# Patient Record
Sex: FEMALE | Race: WHITE | NOT HISPANIC OR LATINO | Employment: OTHER | URBAN - METROPOLITAN AREA
[De-identification: names, ages, dates, MRNs, and addresses within clinical notes are randomized per-mention and may not be internally consistent; named-entity substitution may affect disease eponyms.]

---

## 2017-06-20 DIAGNOSIS — Z76.82 AWAITING ORGAN TRANSPLANT STATUS: Primary | ICD-10-CM

## 2017-07-11 NOTE — Clinical Note
July 11, 2017        BEATRICE Aguiar  [address]  [place] [address]         Dear BEATRICE Aguiar:    As part of our commitment to share important information with our transplant patients, we want to provide you with the most current kidney and kidney/pancreas transplant outcomes at the [facility] as published bi-annually by the Scientific Registry for Organ Recipients (SRTR).    For kidney transplants performed between 1/1/2014 and 6/30/2016 the 1-year patient and graft survival rates are as follows:   Kidney-Cadaveric Donor Kidney-Living Donor Kidney/Pancreas   Adults [facility] 1-Year Expected 1-Year [facility] 1-Year [facility] 1-Year Expected 1-Year National 1-Year [facility] 1-Year Expected 1-Year National 1-Year   Graft Survival 93.09% 94.31% 93.92% 97.83% 97.54% 97.75% 98.18% 96.73% 96.15%   Patient Survival 96.56% 96.91% 96.57% 100% 98.88% 98.83% 98.18% 97.52% 97.54%         To learn more about transplant outcomes in the [place] you can go to www.srtr.org.     Please call the Kidney Transplant Office at [phone] or [phone] should you have any questions or if:     Your insurance changes in any way   Your address or phone number changes   There are changes in your health   You are in the hospital or Emergency Room for any reason      Sincerely,  Kidney Transplant Team

## 2017-07-31 DIAGNOSIS — Z76.82 ORGAN TRANSPLANT CANDIDATE: ICD-10-CM

## 2018-01-08 ENCOUNTER — TELEPHONE (OUTPATIENT)
Dept: TRANSPLANT | Facility: CLINIC | Age: 78
End: 2018-01-08

## 2018-01-08 NOTE — TELEPHONE ENCOUNTER
Pt claims to not have a solid caregiver plan and not ready for transplant at this time, primarily due to fall at Transplant X-mas party. Pt is still pre-dialysis.

## 2018-04-19 PROBLEM — D63.1 ANEMIA IN STAGE 4 CHRONIC KIDNEY DISEASE: Status: ACTIVE | Noted: 2018-04-19

## 2018-04-19 PROBLEM — E87.6 HYPOKALEMIA: Status: ACTIVE | Noted: 2018-04-19

## 2018-04-19 PROBLEM — N18.4 CHRONIC KIDNEY DISEASE (CKD), STAGE IV (SEVERE): Status: ACTIVE | Noted: 2018-04-19

## 2018-04-19 PROBLEM — M10.00 IDIOPATHIC GOUT: Status: ACTIVE | Noted: 2018-04-19

## 2018-04-19 PROBLEM — N18.4 ANEMIA IN STAGE 4 CHRONIC KIDNEY DISEASE: Status: ACTIVE | Noted: 2018-04-19

## 2018-05-02 ENCOUNTER — CLINICAL SUPPORT (OUTPATIENT)
Dept: AUDIOLOGY | Facility: CLINIC | Age: 78
End: 2018-05-02

## 2018-05-02 DIAGNOSIS — H90.3 SENSORINEURAL HEARING LOSS, BILATERAL: Primary | ICD-10-CM

## 2018-05-02 NOTE — PROGRESS NOTES
Ms. Aguiar was seen for a hearing aid follow up appointment.  I increased the gain to 100%.  She did not think her hearing changed and decided not to complete an audiogram.    She purchased supplies and will call if she has any problems.

## 2018-06-14 PROBLEM — R80.9 PROTEINURIA OF UNDIAGNOSED CAUSE: Status: ACTIVE | Noted: 2018-06-14

## 2018-06-14 PROBLEM — I10 ESSENTIAL HYPERTENSION: Status: ACTIVE | Noted: 2018-06-14

## 2018-09-04 PROBLEM — T82.858A ARTERIOVENOUS FISTULA STENOSIS: Status: ACTIVE | Noted: 2018-09-04

## 2018-09-05 ENCOUNTER — TELEPHONE (OUTPATIENT)
Dept: CARDIOLOGY | Facility: HOSPITAL | Age: 78
End: 2018-09-05

## 2018-09-05 ENCOUNTER — TELEPHONE (OUTPATIENT)
Dept: CARDIOLOGY | Facility: CLINIC | Age: 78
End: 2018-09-05

## 2018-09-05 DIAGNOSIS — T82.898A PROBLEM WITH DIALYSIS ACCESS, INITIAL ENCOUNTER: Primary | ICD-10-CM

## 2018-09-05 NOTE — TELEPHONE ENCOUNTER
----- Message from Tabby Abebe sent at 9/5/2018  8:50 AM CDT -----  Contact: Self [phone]  New patient would like to speak with you about scheduling an appointment to check her port in her arm. Please advise

## 2018-09-05 NOTE — TELEPHONE ENCOUNTER
Patient is being referred from Dr. Edouard for AVF malfunction.    Should patient have a hemodialysis US prior to follow up?

## 2018-09-10 ENCOUNTER — DOCUMENTATION ONLY (OUTPATIENT)
Dept: AUDIOLOGY | Facility: CLINIC | Age: 78
End: 2018-09-10

## 2018-09-10 ENCOUNTER — HOSPITAL ENCOUNTER (OUTPATIENT)
Dept: RADIOLOGY | Facility: HOSPITAL | Age: 78
Discharge: HOME | End: 2018-09-10
Attending: INTERNAL MEDICINE
Payer: MEDICARE

## 2018-09-10 DIAGNOSIS — T82.898A PROBLEM WITH DIALYSIS ACCESS, INITIAL ENCOUNTER: ICD-10-CM

## 2018-09-10 PROCEDURE — 93990 DOPPLER FLOW TESTING: CPT | Mod: 26,NTX,, | Performed by: RADIOLOGY

## 2018-09-10 PROCEDURE — 93990 DOPPLER FLOW TESTING: CPT | Mod: TC,NTX

## 2018-10-10 DIAGNOSIS — Z76.82 ORGAN TRANSPLANT CANDIDATE: ICD-10-CM

## 2018-12-20 ENCOUNTER — TELEPHONE (OUTPATIENT)
Dept: TRANSPLANT | Facility: CLINIC | Age: 78
End: 2018-12-20

## 2018-12-20 NOTE — TELEPHONE ENCOUNTER
Jack Singer, RN  P [facility] Kidney Transplant Social Workers             Good Afternoon Ladies,     This pt is currently inactive per her choice and when I did speak with her in January she stated she did not have a caregiver. I attempted to call pt today for an update. Can one of you call her to follow-up on caregiver plan.     Thanks   M      SHINE received the following message from pt's coordinator. SW attempted to contact pt to follow up with her caregiver plan. SHINE did not answer the phone and a brief message was left. SHINE remains available.

## 2018-12-20 NOTE — PROGRESS NOTES
LM for pt to return call regarding update. Also sent msg to SW to call pt for update on caregiver plan.

## 2019-05-29 PROBLEM — T82.858A ARTERIOVENOUS FISTULA STENOSIS: Status: RESOLVED | Noted: 2018-09-04 | Resolved: 2019-05-29

## 2019-06-26 ENCOUNTER — TELEPHONE (OUTPATIENT)
Dept: TRANSPLANT | Facility: CLINIC | Age: 79
End: 2019-06-26

## 2019-06-26 NOTE — TELEPHONE ENCOUNTER
Spoke w/pt regarding transplant team's decision to remove her from kidney transplant [facility]. Pt verbalized understanding.

## 2019-06-28 ENCOUNTER — COMMITTEE REVIEW (OUTPATIENT)
Dept: TRANSPLANT | Facility: CLINIC | Age: 79
End: 2019-06-28

## 2019-06-28 NOTE — LETTER
June 28, 2019    BEATRICE Aguiar  [address]  [place] [address]    Dear BEATRICE Aguiar:  MRN: [id number]    It is the duty of the [facility] to determine which patients are candidates for a transplant. For this reason, our committee has the difficult task of evaluating patients to determine which ones have the greatest chance of having a successful transplant. We are aware of the magnitude of this responsibility, and we approach it with reverence and humility.    Your current health status was reviewed at a recent selection committee meeting.  It is with regret I inform you that you are no longer a suitable transplant candidate because of no caregiver plan, history of home oxygen, bilateral ureteral stents and increased age.  Your name has been removed from the wait list effective 6/28/19.    The [facility] Selection Committee carefully considers each patient's transplant candidacy and determines whether it is safe to proceed with transplantation on a case-by-case basis using established selection criteria.  In the past, you were considered to be a suitable transplant candidate.  At present, the risk of proceeding with an elective transplant surgery has become too high.                                                                                                 Although the selection committee believes you are not a suitable transplant candidate, you have the option to be evaluated at other transplant centers.  You may request your [facility] records be sent to any center of your choice by contacting our Medical Records Department at [phone].                                                                               Attached is a letter from the [employer] Network for Organ Sharing (UNOS).  It describes the services and information offered to patients by UNOS and the Organ Procurement and Transplant Network.                                                                                                                                       The [facility] sincerely wishes you the best and remains available to answer any questions.  Please do not hesitate to contact our pre-transplant office if we can assist you in any other way.                                                                               Sincerely,      Naz Atkins MD  Medical Director, Kidney & Kidney/Pancreas Transplantation  lh/Enclosure    In basket:  Dr. Leo Edouard          OPTN/UNOS: Your Resource for Organ Transplant Information        If you have a question regarding your own medical care, you always should call your transplant center first. However, for general organ transplant-related information, you can call the [employer] Organ Sharing (UNOS) toll-free patient services line at 1-496.711.2661.    Anyone, including potential transplant candidates, recipients, family members/friends, living donors, and/or donor family members can call this number to:    · talk about organ donation, living donation, how transplant and donation work, the donation process, transplant policies, and transplant/donor information;  · get a free patient information kit with helpful booklets, waiting list and transplant information, and a list of all transplant centers;  · ask questions about the Organ Procurement and Transplantation Network (OPTN) web site (www.optn.transplant.hrsa.gov); the UNOS Web site (www.unos.org); or the UNOS web site for living donors and transplant recipients (www.transplantliving.org);  · learn how UNOS and the OPTN can help you;  · talk about any concerns that you may have with a transplant center and how they perform    UNOS is a not-for-profit organization that provides all of the administrative services for the national OPTN under federal contract to the Health Resources and Services Administration (HRSA), an agency under the U.S. Department of Health  and Human Services ([facility]).     UNOS and OPTN responsibilities include:    · writing educational material for patients, the public and professionals;  · helping to make people aware of the need for donated organs and tissue;  · writing organ transplant policy with help from doctors, nurses, transplant patients/candidates, donor families, living donors, and the public;  · coordinating the organ matching and placement process;  · collecting information about every organ transplant and donation that occurs in the [place].    Remember, you should contact your transplant center directly if you have questions or concerns about your own medical care including medical records, work-up progress and test reports. [facility] is not your transplant center, and staff at [facility] will not be able to transfer you to your transplant center, so keep your transplant centers phone number handy. But, while you research your transplant needs and learn as much as you can about transplantation and donation, we welcome your call to our toll-free patient services line at [phone].

## 2019-06-28 NOTE — PROGRESS NOTES
Hilda Aguiar medical records reviewed with . Provider determined that due to having potential safety issues that the patient will be removed from the waitlist at this time.       Hilda Aguiar notified of safety concerns related to transplant at this time. Patients Nephrologist notified of above concerns by letter.

## 2019-07-02 ENCOUNTER — DOCUMENTATION ONLY (OUTPATIENT)
Dept: AUDIOLOGY | Facility: CLINIC | Age: 79
End: 2019-07-02

## 2019-10-10 PROBLEM — N18.5 ANEMIA IN STAGE 5 CHRONIC KIDNEY DISEASE, NOT ON CHRONIC DIALYSIS: Status: ACTIVE | Noted: 2018-04-19

## 2019-11-11 ENCOUNTER — TELEPHONE (OUTPATIENT)
Dept: AUDIOLOGY | Facility: CLINIC | Age: 79
End: 2019-11-11

## 2019-11-28 NOTE — COMMITTEE REVIEW
Native Organ Dx: Hypertensive Nephrosclerosis      Not approved for LRD/CAD transplant due to no caregiver plan, history of home oxygen, bilateral ureteral stents and increased age.      Note written by Jack Singer    ===============================================    I was present at the meeting and attest to the decision of the committee.    I was present at the meeting and attest to the decision of the committee.  
Patient was informed of the reason for this intervention.

## 2019-12-11 PROBLEM — N18.5 ANEMIA DUE TO STAGE 5 CHRONIC KIDNEY DISEASE, NOT ON CHRONIC DIALYSIS: Status: ACTIVE | Noted: 2019-12-11

## 2019-12-11 PROBLEM — E87.20 ACIDOSIS, METABOLIC: Status: ACTIVE | Noted: 2019-12-11

## 2019-12-11 PROBLEM — D63.1 ANEMIA DUE TO STAGE 5 CHRONIC KIDNEY DISEASE, NOT ON CHRONIC DIALYSIS: Status: ACTIVE | Noted: 2019-12-11

## 2019-12-16 ENCOUNTER — DOCUMENTATION ONLY (OUTPATIENT)
Dept: AUDIOLOGY | Facility: CLINIC | Age: 79
End: 2019-12-16

## 2020-03-19 PROBLEM — N18.5 TYPE 2 DIABETES MELLITUS WITH STAGE 5 CHRONIC KIDNEY DISEASE NOT ON CHRONIC DIALYSIS, WITHOUT LONG-TERM CURRENT USE OF INSULIN: Status: ACTIVE | Noted: 2020-03-19

## 2020-03-19 PROBLEM — E11.22 TYPE 2 DIABETES MELLITUS WITH STAGE 5 CHRONIC KIDNEY DISEASE NOT ON CHRONIC DIALYSIS, WITHOUT LONG-TERM CURRENT USE OF INSULIN: Status: ACTIVE | Noted: 2020-03-19

## 2020-06-01 ENCOUNTER — TELEPHONE (OUTPATIENT)
Dept: AUDIOLOGY | Facility: CLINIC | Age: 80
End: 2020-06-01

## 2020-06-01 ENCOUNTER — CLINICAL SUPPORT (OUTPATIENT)
Dept: AUDIOLOGY | Facility: CLINIC | Age: 80
End: 2020-06-01
Payer: MEDICARE

## 2020-06-01 DIAGNOSIS — H90.3 SENSORY HEARING LOSS, BILATERAL: Primary | ICD-10-CM

## 2020-06-01 DIAGNOSIS — H90.3 SENSORINEURAL HEARING LOSS, BILATERAL: Primary | ICD-10-CM

## 2020-06-01 DIAGNOSIS — H90.3 SENSORY HEARING LOSS, BILATERAL: ICD-10-CM

## 2020-06-01 PROCEDURE — 92557 COMPREHENSIVE HEARING TEST: CPT | Mod: S$GLB,,, | Performed by: AUDIOLOGIST

## 2020-06-01 PROCEDURE — 99499 NO LOS: ICD-10-PCS | Mod: S$GLB,,, | Performed by: AUDIOLOGIST

## 2020-06-01 PROCEDURE — 99499 UNLISTED E&M SERVICE: CPT | Mod: S$GLB,,, | Performed by: AUDIOLOGIST

## 2020-06-01 PROCEDURE — 92557 PR COMPREHENSIVE HEARING TEST: ICD-10-PCS | Mod: S$GLB,,, | Performed by: AUDIOLOGIST
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
## 2020-06-01 NOTE — PROGRESS NOTES
Hilda Aguiar was seen today in the clinic for an audiologic evaluation.  Patients main complaint was hearing loss.  Mrs. Aguiar reported that she feels her hearing has worsened since last testing in 2016.  Mrs. Aguiar is a previous hearing aid wearer, but has lost both of her hearing aids that are no longer under warranty.  New hearing aid technology will be discussed today.    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  Speech reception thresholds were noted at 55 dB in the right ear and 50 dB in the left ear.  Speech discrimination scores were 64% in the right ear and 80% in the left ear.    Recommendations:  1. Annual audiogram  2. Noise protection when in noise  3. Hearing aid consultation

## 2020-06-04 ENCOUNTER — HOSPITAL ENCOUNTER (OUTPATIENT)
Dept: RADIOLOGY | Facility: HOSPITAL | Age: 80
Discharge: HOME | End: 2020-06-04
Attending: INTERNAL MEDICINE
Payer: MEDICARE

## 2020-06-04 DIAGNOSIS — N18.5 CHRONIC KIDNEY DISEASE (CKD), STAGE V: ICD-10-CM

## 2020-06-04 PROCEDURE — 71046 XR CHEST PA AND LATERAL: ICD-10-PCS | Mod: 26,,, | Performed by: RADIOLOGY

## 2020-06-04 PROCEDURE — 71046 X-RAY EXAM CHEST 2 VIEWS: CPT | Mod: TC,FY

## 2020-06-04 PROCEDURE — 71046 X-RAY EXAM CHEST 2 VIEWS: CPT | Mod: 26,,, | Performed by: RADIOLOGY

## 2021-03-31 ENCOUNTER — HOSPITAL ENCOUNTER (EMERGENCY)
Facility: HOSPITAL | Age: 81
Discharge: HOME | End: 2021-03-31
Attending: EMERGENCY MEDICINE
Payer: MEDICARE

## 2021-03-31 VITALS
RESPIRATION RATE: 18 BRPM | HEIGHT: 60 IN | SYSTOLIC BLOOD PRESSURE: 186 MMHG | OXYGEN SATURATION: 98 % | HEART RATE: 80 BPM | DIASTOLIC BLOOD PRESSURE: 88 MMHG | TEMPERATURE: 98 F | WEIGHT: 103.75 LBS | BODY MASS INDEX: 20.37 KG/M2

## 2021-03-31 DIAGNOSIS — Z78.9 NEED FOR FOLLOW-UP BY SOCIAL WORKER: ICD-10-CM

## 2021-03-31 DIAGNOSIS — R41.0 CONFUSION: Primary | ICD-10-CM

## 2021-03-31 LAB
ALBUMIN SERPL BCP-MCNC: 3.2 G/DL (ref 3.5–5.2)
ALP SERPL-CCNC: 74 U/L (ref 55–135)
ALT SERPL W/O P-5'-P-CCNC: 6 U/L (ref 10–44)
ANION GAP SERPL CALC-SCNC: 15 MMOL/L (ref 8–16)
AST SERPL-CCNC: 15 U/L (ref 10–40)
BILIRUB SERPL-MCNC: 0.4 MG/DL (ref 0.1–1)
BUN SERPL-MCNC: 67 MG/DL (ref 8–23)
CALCIUM SERPL-MCNC: 8.3 MG/DL (ref 8.7–10.5)
CHLORIDE SERPL-SCNC: 102 MMOL/L (ref 95–110)
CO2 SERPL-SCNC: 25 MMOL/L (ref 23–29)
CREAT SERPL-MCNC: 8.1 MG/DL (ref 0.5–1.4)
ERYTHROCYTE [DISTWIDTH] IN BLOOD BY AUTOMATED COUNT: 15.6 % (ref 11.5–14.5)
EST. GFR  (AFRICAN AMERICAN): 5 ML/MIN/1.73 M^2
EST. GFR  (NON AFRICAN AMERICAN): 4 ML/MIN/1.73 M^2
GLUCOSE SERPL-MCNC: 96 MG/DL (ref 70–110)
HCT VFR BLD AUTO: 26.2 % (ref 37–48.5)
HGB BLD-MCNC: 8.2 G/DL (ref 12–16)
MCH RBC QN AUTO: 30.3 PG (ref 27–31)
MCHC RBC AUTO-ENTMCNC: 31.3 G/DL (ref 32–36)
MCV RBC AUTO: 97 FL (ref 82–98)
PLATELET # BLD AUTO: 239 K/UL (ref 150–450)
PMV BLD AUTO: 9.9 FL (ref 9.2–12.9)
POTASSIUM SERPL-SCNC: 4 MMOL/L (ref 3.5–5.1)
PROT SERPL-MCNC: 6 G/DL (ref 6–8.4)
RBC # BLD AUTO: 2.71 M/UL (ref 4–5.4)
SODIUM SERPL-SCNC: 142 MMOL/L (ref 136–145)
WBC # BLD AUTO: 5.15 K/UL (ref 3.9–12.7)

## 2021-03-31 PROCEDURE — 80053 COMPREHEN METABOLIC PANEL: CPT | Performed by: EMERGENCY MEDICINE

## 2021-03-31 PROCEDURE — 99283 EMERGENCY DEPT VISIT LOW MDM: CPT

## 2021-03-31 PROCEDURE — 85027 COMPLETE CBC AUTOMATED: CPT | Performed by: EMERGENCY MEDICINE

## 2021-04-16 ENCOUNTER — PATIENT MESSAGE (OUTPATIENT)
Dept: RESEARCH | Facility: HOSPITAL | Age: 81
End: 2021-04-16